# Patient Record
Sex: MALE | Race: WHITE | NOT HISPANIC OR LATINO | Employment: OTHER | URBAN - METROPOLITAN AREA
[De-identification: names, ages, dates, MRNs, and addresses within clinical notes are randomized per-mention and may not be internally consistent; named-entity substitution may affect disease eponyms.]

---

## 2017-04-03 ENCOUNTER — AMBULATORY - HEALTHEAST (OUTPATIENT)
Dept: CARDIOLOGY | Facility: CLINIC | Age: 77
End: 2017-04-03

## 2017-04-04 ENCOUNTER — OFFICE VISIT - HEALTHEAST (OUTPATIENT)
Dept: CARDIOLOGY | Facility: CLINIC | Age: 77
End: 2017-04-04

## 2017-04-04 DIAGNOSIS — G47.33 OBSTRUCTIVE SLEEP APNEA SYNDROME: ICD-10-CM

## 2017-04-04 DIAGNOSIS — I47.20 VT (VENTRICULAR TACHYCARDIA) (H): ICD-10-CM

## 2017-04-04 DIAGNOSIS — J44.89 CHRONIC AIRWAY OBSTRUCTION, NOT ELSEWHERE CLASSIFIED: ICD-10-CM

## 2017-04-04 RX ORDER — SIMVASTATIN 80 MG
80 TABLET ORAL AT BEDTIME
Status: SHIPPED | COMMUNITY
Start: 2017-04-04

## 2017-04-04 RX ORDER — OXYCODONE HYDROCHLORIDE 5 MG/1
5 TABLET ORAL EVERY 4 HOURS PRN
Status: SHIPPED | COMMUNITY
Start: 2017-04-04

## 2017-04-04 ASSESSMENT — MIFFLIN-ST. JEOR: SCORE: 1632.06

## 2017-04-05 ENCOUNTER — COMMUNICATION - HEALTHEAST (OUTPATIENT)
Dept: CARDIOLOGY | Facility: CLINIC | Age: 77
End: 2017-04-05

## 2017-04-07 ENCOUNTER — HOSPITAL ENCOUNTER (OUTPATIENT)
Dept: CARDIOLOGY | Facility: CLINIC | Age: 77
Discharge: HOME | End: 2017-04-07
Attending: INTERNAL MEDICINE

## 2017-04-09 ENCOUNTER — COMMUNICATION - HEALTHEAST (OUTPATIENT)
Dept: CARDIOLOGY | Facility: CLINIC | Age: 77
End: 2017-04-09

## 2018-02-27 ENCOUNTER — COMMUNICATION - HEALTHEAST (OUTPATIENT)
Dept: ADMINISTRATIVE | Facility: CLINIC | Age: 78
End: 2018-02-27

## 2018-05-17 ENCOUNTER — OFFICE VISIT - HEALTHEAST (OUTPATIENT)
Dept: CARDIOLOGY | Facility: CLINIC | Age: 78
End: 2018-05-17

## 2018-05-17 DIAGNOSIS — I31.9 DISEASE OF PERICARDIUM: ICD-10-CM

## 2018-05-17 DIAGNOSIS — I47.20 VT (VENTRICULAR TACHYCARDIA) (H): ICD-10-CM

## 2018-05-17 DIAGNOSIS — G47.33 OBSTRUCTIVE SLEEP APNEA SYNDROME: ICD-10-CM

## 2018-05-17 RX ORDER — MECLIZINE HCL 12.5 MG 12.5 MG/1
1-2 TABLET ORAL 3 TIMES DAILY PRN
Status: SHIPPED | COMMUNITY
Start: 2018-05-04

## 2018-05-17 RX ORDER — ACETAMINOPHEN 325 MG/1
650 TABLET ORAL EVERY 6 HOURS PRN
Status: SHIPPED | COMMUNITY
Start: 2018-05-17

## 2018-05-17 RX ORDER — ASPIRIN 325 MG
325 TABLET, DELAYED RELEASE (ENTERIC COATED) ORAL DAILY
Status: SHIPPED | COMMUNITY
Start: 2018-05-17

## 2018-05-17 ASSESSMENT — MIFFLIN-ST. JEOR: SCORE: 1681.95

## 2018-12-20 ENCOUNTER — RECORDS - HEALTHEAST (OUTPATIENT)
Dept: ADMINISTRATIVE | Facility: OTHER | Age: 78
End: 2018-12-20

## 2019-03-29 ENCOUNTER — RECORDS - HEALTHEAST (OUTPATIENT)
Dept: ADMINISTRATIVE | Facility: OTHER | Age: 79
End: 2019-03-29

## 2019-07-23 ENCOUNTER — RECORDS - HEALTHEAST (OUTPATIENT)
Dept: ADMINISTRATIVE | Facility: OTHER | Age: 79
End: 2019-07-23

## 2019-10-30 ENCOUNTER — OFFICE VISIT - HEALTHEAST (OUTPATIENT)
Dept: CARDIOLOGY | Facility: CLINIC | Age: 79
End: 2019-10-30

## 2019-10-30 DIAGNOSIS — G47.33 OBSTRUCTIVE SLEEP APNEA SYNDROME: ICD-10-CM

## 2019-10-30 DIAGNOSIS — I31.9 DISEASE OF PERICARDIUM: ICD-10-CM

## 2019-10-30 DIAGNOSIS — I47.20 VT (VENTRICULAR TACHYCARDIA) (H): ICD-10-CM

## 2020-11-14 ENCOUNTER — COMMUNICATION - HEALTHEAST (OUTPATIENT)
Dept: CARDIOLOGY | Facility: CLINIC | Age: 80
End: 2020-11-14

## 2020-11-14 DIAGNOSIS — I47.20 VT (VENTRICULAR TACHYCARDIA) (H): ICD-10-CM

## 2020-11-14 DIAGNOSIS — G47.33 OBSTRUCTIVE SLEEP APNEA SYNDROME: ICD-10-CM

## 2020-11-14 DIAGNOSIS — I31.9 DISEASE OF PERICARDIUM: ICD-10-CM

## 2020-12-02 ENCOUNTER — OFFICE VISIT - HEALTHEAST (OUTPATIENT)
Dept: CARDIOLOGY | Facility: CLINIC | Age: 80
End: 2020-12-02

## 2020-12-02 DIAGNOSIS — I47.20 VT (VENTRICULAR TACHYCARDIA) (H): ICD-10-CM

## 2020-12-02 DIAGNOSIS — G47.33 OBSTRUCTIVE SLEEP APNEA SYNDROME: ICD-10-CM

## 2020-12-02 RX ORDER — INSULIN DEGLUDEC 100 U/ML
7 INJECTION, SOLUTION SUBCUTANEOUS
Status: SHIPPED | COMMUNITY
Start: 2020-04-13

## 2021-02-03 ENCOUNTER — COMMUNICATION - HEALTHEAST (OUTPATIENT)
Dept: CARDIOLOGY | Facility: CLINIC | Age: 81
End: 2021-02-03

## 2021-02-03 DIAGNOSIS — I47.20 VT (VENTRICULAR TACHYCARDIA) (H): ICD-10-CM

## 2021-02-03 DIAGNOSIS — I31.9 DISEASE OF PERICARDIUM: ICD-10-CM

## 2021-02-03 DIAGNOSIS — G47.33 OBSTRUCTIVE SLEEP APNEA SYNDROME: ICD-10-CM

## 2021-02-04 RX ORDER — METOPROLOL SUCCINATE 100 MG/1
TABLET, EXTENDED RELEASE ORAL
Qty: 90 TABLET | Refills: 3 | Status: SHIPPED | OUTPATIENT
Start: 2021-02-04 | End: 2021-12-27

## 2021-05-06 ENCOUNTER — COMMUNICATION - HEALTHEAST (OUTPATIENT)
Dept: FAMILY MEDICINE | Facility: CLINIC | Age: 81
End: 2021-05-06

## 2021-05-06 ENCOUNTER — RECORDS - HEALTHEAST (OUTPATIENT)
Dept: TELEHEALTH | Facility: CLINIC | Age: 81
End: 2021-05-06

## 2021-05-30 VITALS — BODY MASS INDEX: 34.87 KG/M2 | WEIGHT: 217 LBS | HEIGHT: 66 IN

## 2021-06-01 VITALS — WEIGHT: 228 LBS | BODY MASS INDEX: 36.64 KG/M2 | HEIGHT: 66 IN

## 2021-06-02 NOTE — PROGRESS NOTES
[facility] Heart Care Note    Assessment:   Chronic obstructive pulmonary disease, remote cigarette use.   Satisfactory cholesterol profile while on treatment.    Prostate cancer with prior radical prostatectomy. No Lupron therapy for the past 2-1/2 years. PSA about 1   Obstructive sleep apnea with utility of CPAP-1999.   Chronic pericardial effusion dating back to 1999 without hemodynamic         compromise or tamponade. Echocardiogram 1 13 2015 unchanged; small posterior pericardial effusion       Echocardiogram April 26, 2018 showed small pericardial effusion with no tamponade  ejection fraction 59%  7. History of repetitive monoformic ventricular tachycardia with successful ablation of PVCs 5/11/2000 without recurrence.  9. 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 26, 2018-no carotid blockage       Plan:    Your heart condition seems quite stable over past year   Blood pressure and heart rate are good  Recent blood work, July 25, 2019 was excellent  Echocardiogram April 27, 2018 showed strong heart function  You are scheduled to have a chest CT scan November 15 to follow-up on lung cancer  Reviewed your medicines shows that you on 2 beta-blockers including atenolol 25 mg twice daily and metoprolol 50 mg daily  I would recommend increase metoprolol to 100 mg daily-prescription called; and stopping atenolol  Continue other medications  Return in 1 year for comprehensive cardiac evaluation-sooner if problems arise        Subjective:    I had the opportunity to see.Osvaldo Perkins , who is a 79 y.o. male with a known history of prostate cancer treated with radical prostatectomy and lung cancer treated with surgical resection of left lung region  Silvestre has had no awareness of palpitations or arrhythmias no  syncopal or near syncopal episodes  Reviewed his medicine shows that he is on atenolol 25 mg twice daily and metoprolol 50 mg daily-not clear why he is on 2 different beta-blockers but has been on those for quite some time we discussed simplifying the medical program I recommend he increase metoprolol to 100 mg daily and stop the atenolol  He has had a number of studies done through the [facility] and I reviewed those through care everywhere.    On July 25, 2019 his laboratory evaluation was normal and creatinine was 0.95 liver panel was normal  There is some concern of left lung mass and is scheduled for a chest CT scan November 15  His PSA remains elevated around 1 even though he has had a radical prostatectomy.  He no longer takes Lupron  He has less dizziness and vertigo.  Symptoms are related to a cerebellar stroke April 26, 2018-at that time there was no carotid blockage     Problem List:  Patient Active Problem List   Diagnosis     Type 2 Diabetes Mellitus     Obesity     Pericardial Effusion     Chronic Obstructive Pulmonary Disease     Sleep Apnea     VT (ventricular tachycardia) (H)     Medical History:  No past medical history on file.  Surgical History:  Past Surgical History:   Procedure Laterality Date     [place] ABLATE DYSRHYTHM FOCUS RX VTACK      Description: Catheter Ablation Of Focus For Ventricular Tachycardia;  Recorded: 12/18/2012;     Social History:  Social History     Socioeconomic History     Marital status:      Spouse name: Not on file     Number of children: Not on file     Years of education: Not on file     Highest education level: Not on file   Occupational History     Not on file   Social Needs     Financial resource strain: Not on file     Food insecurity:     Worry: Not on file     Inability: Not on file     Transportation needs:     Medical: Not on file     Non-medical: Not on file   Tobacco Use     Smoking status: Former Smoker     Types: Cigarettes     Smokeless tobacco:  Never Used   Substance and Sexual Activity     Alcohol use: Not on file     Drug use: No     Sexual activity: Not on file   Lifestyle     Physical activity:     Days per week: Not on file     Minutes per session: Not on file     Stress: Not on file   Relationships     Social connections:     Talks on phone: Not on file     Gets together: Not on file     Attends Confucianist service: Not on file     Active member of club or organization: Not on file     Attends meetings of clubs or organizations: Not on file     Relationship status: Not on file     Intimate partner violence:     Fear of current or ex partner: Not on file     Emotionally abused: Not on file     Physically abused: Not on file     Forced sexual activity: Not on file   Other Topics Concern     Not on file   Social History Narrative     Not on file       Review of Systems:      General: Weight Gain  Eyes: WNL  Ears/Nose/Throat: Hearing Loss  Lungs: Snoring     Stomach: WNL  Bladder: WNL  Muscle/Joints: WNL  Skin: WNL  Nervous System: Daytime Sleepiness  Mental Health: WNL     Blood: WNL        Family History:  No family history on file.      Allergies:  Allergies   Allergen Reactions     Cefuroxime Axetil      Lisinopril        Medications:  Current Outpatient Medications   Medication Sig Dispense Refill     acetaminophen (TYLENOL) 325 MG tablet Take 650 mg by mouth every 6 (six) hours as needed for pain.       aspirin 325 MG EC tablet Take 325 mg by mouth daily.       buPROPion (WELLBUTRIN SR) 150 MG 12 hr tablet Take 150 mg by mouth 2 (two) times a day.       canagliflozin-metformin (INVOKAMET) 150-1,000 mg Tab Take 1 tablet by mouth 2 (two) times a day.       cholecalciferol, vitamin D3, (VITAMIN D3) 2,000 unit cap Take 5,000 Units by mouth daily.        cyanocobalamin (VITAMIN B-12) 1000 MCG tablet Take 1,000 mcg by mouth daily.        furosemide (LASIX) 40 MG tablet Take 40 mg by mouth daily.        glimepiride (AMARYL) 4 MG tablet Take 1 tablet by  mouth daily.       INVOKANA 300 mg Tab 300 mg once.        loratadine (CLARITIN) 10 mg tablet Take 10 mg by mouth daily.       metFORMIN (GLUCOPHAGE) 500 MG tablet Take 1,000 mg by mouth 2 (two) times a day.       potassium chloride SA (K-DUR,KLOR-CON) 20 MEQ tablet Take 20 mEq by mouth daily.       sertraline (ZOLOFT) 50 MG tablet Take 50 mg by mouth daily.       simvastatin (ZOCOR) 80 MG tablet Take 80 mg by mouth at bedtime.       albuterol (PROVENTIL HFA;VENTOLIN HFA) 90 mcg/actuation inhaler Inhale 2 puffs 4 (four) times a day. As directed       ALBUTEROL INHL Inhale 2 puffs 4 (four) times a day as needed.       celecoxib (CELEBREX) 100 MG capsule 100 mg 2 (two) times a day.        fluticasone (FLOVENT HFA) 110 mcg/actuation inhaler Inhale 2 puffs 2 (two) times a day.       glimepiride (AMARYL) 2 MG tablet Take 2 mg by mouth 2 (two) times a day.        ipratropium (ATROVENT HFA) 17 mcg/actuation inhaler Inhale 1 or 2 puffs 4 times daily as needed       meclizine (ANTIVERT) 12.5 mg tablet Take 1-2 tablets by mouth 3 (three) times a day as needed.       metoprolol succinate (TOPROL-XL) 100 MG 24 hr tablet Take 1 tablet (100 mg total) by mouth daily. 90 tablet 5     oxyCODONE (ROXICODONE) 5 MG immediate release tablet Take 5 mg by mouth every 4 (four) hours as needed for pain.       No current facility-administered medications for this visit.        Objective:   Vital signs:  /58 (Patient Site: Left Arm, Patient Position: Sitting, Cuff Size: Adult Regular)   Pulse 63   Resp 18   Wt 216 lb 3 oz (98.1 kg)   SpO2 99%   BMI 34.89 kg/m    Pulses 16 regular with no ectopic beats      Physical Exam:      GENERAL APPEARANCE: Alert, cooperative and in no acute distress.  HEENT: No scleral icterus. No Xanthelasma. Oral mucous membranes pink and moist.  NECK: JVP normal cm. No Hepatojugular reflux. Thyroid not  Palpable  CHEST: clear to auscultation and percussion  CARDIOVASCULAR: S1, S2 without murmur     Brachial, radial  pulses are intact and symmetric.   No carotid bruits noted.  ABDOMEN: Non tender. BS+. No bruits.  EXTREMITIES: No cyanosis, clubbing or edema.    Lab Results:  LIPIDS:  No results found for: CHOL  No results found for: HDL  No results found for: LDLCALC  No results found for: TRIG  No components found for: CHOLHDL    BMP:  No results found for: CREATININE, BUN, NA, K, CL, CO2      This note has been dictated using voice recognition software. Any grammatical or context distortions are unintentional and inherent to the software.  Tonny Miller MD  [facility]  [phone]

## 2021-06-02 NOTE — PATIENT INSTRUCTIONS - HE
Osvaldo Perkins    Thank you for coming to the [facility] today for a cardiac evaluation  It was my pleasure to see you today  A good contact for any questions would be Maritza Mcclure  RN @ [phone]    Your heart condition seems quite stable over past year   Blood pressure and heart rate are good  Recent blood work, July 25, 2019 was excellent  Echocardiogram April 27, 2018 showed strong heart function  You are scheduled to have a chest CT scan November 15 to follow-up on lung cancer  Reviewed your medicines shows that you on 2 beta-blockers including atenolol 25 mg twice daily and metoprolol 50 mg daily  I would recommend increase metoprolol to 100 mg daily-prescription called; and stopping atenolol  Continue other medications  Return in 1 year for comprehensive cardiac evaluation-sooner if problems arise

## 2021-06-03 VITALS
SYSTOLIC BLOOD PRESSURE: 122 MMHG | HEART RATE: 63 BPM | DIASTOLIC BLOOD PRESSURE: 58 MMHG | BODY MASS INDEX: 34.89 KG/M2 | OXYGEN SATURATION: 99 % | WEIGHT: 216.19 LBS | RESPIRATION RATE: 18 BRPM

## 2021-06-05 VITALS
DIASTOLIC BLOOD PRESSURE: 64 MMHG | WEIGHT: 212.5 LBS | OXYGEN SATURATION: 96 % | BODY MASS INDEX: 34.3 KG/M2 | SYSTOLIC BLOOD PRESSURE: 116 MMHG | RESPIRATION RATE: 14 BRPM | HEART RATE: 70 BPM

## 2021-06-09 NOTE — PROGRESS NOTES
[facility] Heart Care Note    Assessment:  1. Obesity, undergoing dietary consultation his weight is dropped from 250-228 pounds  2. Chronic obstructive pulmonary disease, remote cigarette use.   3. Satisfactory cholesterol profile while on treatment.   4. Prostate cancer with prior radical prostatectomy. No Lupron therapy for the past 2-1/2 years. PSA about 1  5. Obstructive sleep apnea with utility of CPAP-1999.   6. Chronic pericardial effusion dating back to 1999 without hemodynamic   compromise or tamponade. Echocardiogram 1 13 2015 unchanged; small posterior pericardial effusion  7. History of repetitive monoformic ventricular tachycardia with successful ablation of PVCs 5/11/2000 without recurrence.   Satisfactory colonoscopy   9. Adult onset diabetes mellitus on metformin with controlled A1c  Left lung cancer treated with surgical resection September 23 2016-noted by incidental chest CT scan  Cholecystectomy August 26 2016    Plan:  Obtain 24-hour Holter monitor to evaluate possible palpitations  Continue current medications  Will discuss results of Holter monitor  Follow-up in 1 year or sooner as needed arises        Subjective:    I had the opportunity to see.Osvaldo Perkins , who is a 76 y.o. male with a known history of ventricular arrhythmias, ventricular tachycardia and previous ventricular tachycardia ablation  He had a cholecystectomy August 26 2016. During that evaluation a chest CT scan showed a left lung cancer  He underwent a left lung operation September 23 2016-partial lung resection he did not require radiation or chemotherapy  His been followed by the oncologist and has been given good reports  Sometimes he feels a knot-like sensation sort of like a palpitation  His breathing seems satisfactory, hasn't changed since his lung operation  He denies angina  His fluid level seemed controlled and he has minimal pedal edema no orthopnea or PND  He has had no syncopal episodes  I reviewed some recent  laboratory studies that showed a normal CBC, BMP      Problem List:  Patient Active Problem List   Diagnosis     Type 2 Diabetes Mellitus     Obesity     Pericardial Effusion     Chronic Obstructive Pulmonary Disease     Sleep Apnea     Medical History:  No past medical history on file.  Surgical History:  Past Surgical History:   Procedure Laterality Date     [place] ABLATE DYSRHYTHM FOCUS RX VTACK      Description: Catheter Ablation Of Focus For Ventricular Tachycardia;  Recorded: 12/18/2012;     Social History:  Social History     Social History     Marital status:      Spouse name: N/A     Number of children: N/A     Years of education: N/A     Occupational History     Not on file.     Social History Main Topics     Smoking status: Former Smoker     Smokeless tobacco: Not on file     Alcohol use Not on file     Drug use: Not on file     Sexual activity: Not on file     Other Topics Concern     Not on file     Social History Narrative       Review of Systems:      General: WNL  Eyes: WNL  Ears/Nose/Throat: WNL  Lungs: WNL  Heart: WNL  Stomach: WNL  Bladder: WNL  Muscle/Joints: WNL  Skin: WNL  Nervous System: WNL  Mental Health: WNL     Blood: WNL        Family History:  No family history on file.      Allergies:  Allergies   Allergen Reactions     Cefuroxime Axetil      Lisinopril        Medications:  Current Outpatient Prescriptions   Medication Sig Dispense Refill     aspirin 81 MG EC tablet Take 81 mg by mouth daily.       atenolol (TENORMIN) 25 MG tablet Take 25 mg by mouth 2 (two) times a day.       buPROPion (WELLBUTRIN SR) 150 MG 12 hr tablet Take 150 mg by mouth 2 (two) times a day.       cholecalciferol, vitamin D3, (VITAMIN D3) 2,000 unit cap Take 1 capsule by mouth daily.       cyanocobalamin (VITAMIN B-12) 1000 MCG tablet Take 5,000 mcg by mouth daily.        furosemide (LASIX) 40 MG tablet Take 60 mg by mouth daily.       glimepiride (AMARYL) 2 MG tablet Take 2 mg by mouth every morning before  "breakfast.       INVOKANA 300 mg Tab 300 mg once.        loratadine (CLARITIN) 10 mg tablet Take 10 mg by mouth daily.       metFORMIN (GLUCOPHAGE) 500 MG tablet Take 1,000 mg by mouth 2 (two) times a day.       oxyCODONE (ROXICODONE) 5 MG immediate release tablet Take 5 mg by mouth every 4 (four) hours as needed for pain.       potassium chloride SA (K-DUR,KLOR-CON) 20 MEQ tablet Take 20 mEq by mouth daily.       sertraline (ZOLOFT) 50 MG tablet Take 50 mg by mouth daily.       simvastatin (ZOCOR) 80 MG tablet Take 80 mg by mouth at bedtime.       albuterol (PROVENTIL HFA;VENTOLIN HFA) 90 mcg/actuation inhaler Inhale 2 puffs 4 (four) times a day. As directed       ALBUTEROL INHL Inhale 2 puffs 4 (four) times a day as needed.       celecoxib (CELEBREX) 100 MG capsule 100 mg 2 (two) times a day.        fluticasone (FLOVENT HFA) 110 mcg/actuation inhaler Inhale 2 puffs 2 (two) times a day.       ipratropium (ATROVENT HFA) 17 mcg/actuation inhaler Inhale 1 or 2 puffs 4 times daily as needed       simvastatin (ZOCOR) 40 MG tablet Take 40 mg by mouth bedtime.       No current facility-administered medications for this visit.        Objective:   Vital signs:  /60 (Patient Site: Left Arm, Patient Position: Sitting, Cuff Size: Adult Large)  Pulse 72  Resp 16  Ht 5' 6\" (1.676 m)  Wt 217 lb (98.4 kg)  BMI 35.02 kg/m2      Physical Exam:  120/70 sitting, 128 standing heart rate is 68 regular without ectopics      GENERAL APPEARANCE: Alert, cooperative and in no acute distress.  HEENT: No scleral icterus. No Xanthelasma. Oral mucous membranes pink and moist.  NECK: JVP  Normal cm. No Hepatojugular reflux. Thyroid not  Palpable  CHEST: clear to auscultation and percussion left posterior thoracotomy scar  CARDIOVASCULAR: S1, S2 without murmur    Brachial, radial  pulses are intact and symmetric.   No carotid bruits noted.  ABDOMEN: Non tender. BS+. No bruits.  Midline laparoscopic scars from previous " cholecystectomy/laparoscopic  EXTREMITIES: No cyanosis, clubbing or edema.    Lab Results:  LIPIDS:  No results found for: CHOL  No results found for: HDL  No results found for: LDLCALC  No results found for: TRIG  No components found for: CHOLHDL    BMP:  No results found for: CREATININE, BUN, NA, K, CL, CO2      This note has been dictated using voice recognition software. Any grammatical or context distortions are unintentional and inherent to the software.  Tonny Miller MD  [facility]  [phone]

## 2021-06-13 NOTE — PATIENT INSTRUCTIONS - HE
Osvaldo Perkins    Thank you for coming to the [facility] today for a cardiac evaluation  It was my pleasure to see you today  A good contact for any questions would be Maritza Mcclure  RN @ [phone]    Your heart condition seems quite stable  No evidence for significant arrhythmias  No fluid buildup or signs of congestion  You did have a lung biopsy the other day and this needs to be further evaluated and treated.  Apparently the pathology showed that it was different from your left upper lobe cancer that was treated with lobectomy September 23, 2016  Continue your  current medications no adjustments  Return in 1 year for comprehensive cardiac assessment

## 2021-06-13 NOTE — PROGRESS NOTES
[facility] Heart Care Note    Assessment:   Chronic obstructive pulmonary disease, remote cigarette use.   CPAP; MARY BETH  Satisfactory cholesterol profile while on treatment.    Prostate cancer with prior radical prostatectomy. No Lupron therapy for the past 2-1/2 years. PSA about 1  Chronic pericardial effusion dating back to 1999 without hemodynamic         compromise or tamponade. Echocardiogram 1 13 2015 unchanged; small posterior pericardial effusion       Echocardiogram April 26, 2018 showed small pericardial effusion with no tamponade  ejection fraction 59%  7. History of repetitive monoformic ventricular tachycardia with successful ablation of PVCs 5/11/2000 without recurrence.  9. 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  Apparently the pathology showed that it was different from your left upper lobe cancer that was treated with lobectomy September 23, 2016  Continue your  current medications no adjustments  Return in 1 year for comprehensive cardiac assessment      Subjective:    I had the opportunity to see.Osvaldo Gregorio , who is a 80 y.o. male with a known history of tracheal or arrhythmias  Because of previous lung cancer he does have periodic chest CT scans apparently this 1 was abnormal this led to a cutaneous biopsy the right mid lung and the report showed a no carcinoma,  non-small cell lung cancer-metastatic.  The pathology was different from his left upper lobe cancer  Is not seen neurologist has not had a treatment plan outlined yet  Heart wise he is doing well  No chest pains  No awareness of palpitations arrhythmias or episodes of ventricular tachycardia  Denies orthopnea PND or pedal edema  Reviewed his medications he is have not changed  Continues furosemide 40 mg daily, metoprolol 100 mg daily potassium 20 mEq/day  Also simvastatin 80 mg daily      Problem List:  Patient Active Problem List   Diagnosis     Type 2 Diabetes Mellitus     Obesity     Pericardial Effusion     Chronic Obstructive Pulmonary Disease     Sleep Apnea     VT (ventricular tachycardia) (H)     Medical History:  No past medical history on file.  Surgical History:  Past Surgical History:   Procedure Laterality Date     [place] ABLATE DYSRHYTHM FOCUS RX VTACK      Description: Catheter Ablation Of Focus For Ventricular Tachycardia;  Recorded: 12/18/2012;     Social History:  Social History     Socioeconomic History     Marital status:      Spouse name: Not on file     Number of children: Not on file     Years of education: Not on file     Highest education level: Not on file   Occupational History     Not on file   Social Needs     Financial resource strain: Not on file     Food insecurity     Worry: Not on file     Inability: Not on file     Transportation needs     Medical: Not on file     Non-medical: Not on file   Tobacco Use     Smoking status: Former Smoker     Types: Cigarettes     Smokeless tobacco: Never Used   Substance and Sexual Activity     Alcohol use: Not on file     Drug use: No     Sexual activity: Not on file   Lifestyle     Physical activity     Days per week: Not on file     Minutes per session: Not on file     Stress: Not on file   Relationships     Social connections     Talks on phone: Not on file     Gets together: Not on file     Attends Judaism service: Not on file     Active  member of club or organization: Not on file     Attends meetings of clubs or organizations: Not on file     Relationship status: Not on file     Intimate partner violence     Fear of current or ex partner: Not on file     Emotionally abused: Not on file     Physically abused: Not on file     Forced sexual activity: Not on file   Other Topics Concern     Not on file   Social History Narrative     Not on file       Review of Systems:      General: WNL  Eyes: WNL  Ears/Nose/Throat: WNL  Lungs: WNL  Heart: WNL  Stomach: WNL  Bladder: WNL  Muscle/Joints: WNL  Skin: WNL  Nervous System: WNL  Mental Health: WNL     Blood: WNL        Family History:  No family history on file.      Allergies:  Allergies   Allergen Reactions     Cefuroxime Axetil      Lisinopril        Medications:  Current Outpatient Medications   Medication Sig Dispense Refill     acetaminophen (TYLENOL) 325 MG tablet Take 650 mg by mouth every 6 (six) hours as needed for pain.       aspirin 325 MG EC tablet Take 325 mg by mouth daily.       buPROPion (WELLBUTRIN SR) 150 MG 12 hr tablet Take 150 mg by mouth 2 (two) times a day.       canagliflozin-metformin (INVOKAMET) 150-1,000 mg Tab Take 1 tablet by mouth 2 (two) times a day.       cholecalciferol, vitamin D3, (VITAMIN D3) 2,000 unit cap Take 5,000 Units by mouth daily.        cyanocobalamin (VITAMIN B-12) 1000 MCG tablet Take 1,000 mcg by mouth daily.        furosemide (LASIX) 40 MG tablet Take 40 mg by mouth daily.        glimepiride (AMARYL) 2 MG tablet Take 1 mg by mouth 2 (two) times a day.        insulin degludec (TRESIBA FLEXTOUCH U-100) 100 unit/mL injection pen Inject 7 Units under the skin.       ipratropium (ATROVENT HFA) 17 mcg/actuation inhaler Inhale 1 or 2 puffs 4 times daily as needed       loratadine (CLARITIN) 10 mg tablet Take 10 mg by mouth daily.       metFORMIN (GLUCOPHAGE) 500 MG tablet Take 1,000 mg by mouth 2 (two) times a day.       metoprolol succinate (TOPROL-XL) 100 MG 24 hr  tablet TAKE 1 TABLET BY MOUTH  DAILY 90 tablet 0     potassium chloride SA (K-DUR,KLOR-CON) 20 MEQ tablet Take 20 mEq by mouth daily.       sertraline (ZOLOFT) 50 MG tablet Take 50 mg by mouth daily.       simvastatin (ZOCOR) 80 MG tablet Take 80 mg by mouth at bedtime.       albuterol (PROVENTIL HFA;VENTOLIN HFA) 90 mcg/actuation inhaler Inhale 2 puffs 4 (four) times a day. As directed       celecoxib (CELEBREX) 100 MG capsule 100 mg 2 (two) times a day.        fluticasone (FLOVENT HFA) 110 mcg/actuation inhaler Inhale 2 puffs 2 (two) times a day.       INVOKANA 300 mg Tab 300 mg once.        meclizine (ANTIVERT) 12.5 mg tablet Take 1-2 tablets by mouth 3 (three) times a day as needed.       oxyCODONE (ROXICODONE) 5 MG immediate release tablet Take 5 mg by mouth every 4 (four) hours as needed for pain.       No current facility-administered medications for this visit.      Objective:   Vital signs:  /64 (Patient Site: Left Arm, Patient Position: Sitting, Cuff Size: Adult Regular)   Pulse 70   Resp 14   Wt 212 lb 8 oz (96.4 kg)   SpO2 96%   BMI 34.30 kg/m        Physical Exam:        GENERAL APPEARANCE: Alert, cooperative and in no acute distress.  HEENT: No scleral icterus. No Xanthelasma. Oral mucous membranes pink and moist.  NECK: JVP normal cm. No Hepatojugular reflux. Thyroid not  Palpable  CHEST: clear to auscultation and percussion  CARDIOVASCULAR: S1, S2 without murmur    Brachial, radial  pulses are intact and symmetric.   No carotid bruits noted.  ABDOMEN: Non tender. BS+. No bruits.  EXTREMITIES: No cyanosis, clubbing or edema.    Lab Results:  LIPIDS:  No results found for: CHOL  No results found for: HDL  No results found for: LDLCALC  No results found for: TRIG  No components found for: CHOLHDL    BMP:  No results found for: CREATININE, BUN, NA, K, CL, CO2      This note has been dictated using voice recognition software. Any grammatical or context distortions are unintentional and inherent  to the software.  Tonny Miller MD  [facility]  [phone]

## 2021-06-15 PROBLEM — I47.20 VT (VENTRICULAR TACHYCARDIA) (H): Status: ACTIVE | Noted: 2017-04-04

## 2021-06-17 NOTE — TELEPHONE ENCOUNTER
Telephone Encounter by Shannen Bentley MD at 5/6/2021  5:37 PM     Author: Shannen Bentley MD Service: -- Author Type: Physician    Filed: 5/6/2021  5:37 PM Encounter Date: 5/6/2021 Status: Signed    : Shannen Bentley MD (Physician)    From: Osvaldo Perkins  Sent: 4/23/2021  6:31 PM CDT  To: Shannen Bentley MD  Subject: RE:schedule your covid vaccine appointment    I've already had my Covid Vaccine 02/04/21 and 03/04/21 Moderna

## 2021-06-18 NOTE — PROGRESS NOTES
[facility] Heart Care Note  :  Assessment:  1. Obesity  2. Chronic obstructive pulmonary disease, remote cigarette use.   3. Satisfactory cholesterol profile while on treatment.   4. Prostate cancer with prior radical prostatectomy. No Lupron therapy for the past 2-1/2 years. PSA about 1  5. Obstructive sleep apnea with utility of CPAP-1999.   6. Chronic pericardial effusion dating back to 1999 without hemodynamic         compromise or tamponade. Echocardiogram 1 13 2015 unchanged; small posterior pericardial effusion       Echocardiogram April 26, 2018 showed small pericardial effusion with no tamponade Alhaji ejection fraction 59%  7. History of repetitive monoformic ventricular tachycardia with successful ablation of PVCs 5/11/2000 without recurrence.  9. 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  There was some changes near the cerebellum which is a part of the brain that deals with balance  Dizziness would be a likely symptom from that event  You have noticed  benefit with meclizine/Antivert  The carotid artery and cerebral circulation seems satisfactory as shown by the MR-angiogram study  Medication adjustments were increasing the aspirin from 81 mg daily to 325 mg daily  Blood pressure is satisfactory  Echocardiogram was good  Continue current medications  Return in 1 year for comprehensive cardiac evaluation        Subjective:    I had the opportunity to see.Osvaldo Gregorio , who is a 77 y.o. male with a known history of ventricular tachycardia  He was very dizzy and was evaluated at [facility] on April 26, 2018.    He has had CT scan was normal but the MRI scan did show a localized area near the cerebellum that look  like a stroke.  His MR angiogram did not show any vascular disease  Echocardiogram was unremarkable, small pericardial effusion was present  Laboratory evaluation was normal except for some elevated blood sugar  He apparently had a stroke, and had dizziness.  He was started on Antivert and seems improved  No paralysis no numbness no diplopia no dysphagia  No cardiac symptoms.  He has no chest pain no awareness of palpitations no syncope  He does have shortness of breath this is combination of lung surgery, COPD this seems stable  Minimal pedal edema  Recent medication changes were to increase his aspirin from 81 mg daily to 325 mg daily    Uses CPAP on a regular basis finds it helpful      Problem List:  Patient Active Problem List   Diagnosis     Type 2 Diabetes Mellitus     Obesity     Pericardial Effusion     Chronic Obstructive Pulmonary Disease     Sleep Apnea     VT (ventricular tachycardia)     Medical History:  No past medical history on file.  Surgical History:  Past Surgical History:   Procedure Laterality Date     [place] ABLATE DYSRHYTHM FOCUS RX VTACK      Description: Catheter Ablation Of Focus For Ventricular Tachycardia;  Recorded: 12/18/2012;     Social History:  Social History     Social History     Marital status:      Spouse name: N/A     Number of children: N/A     Years of education: N/A     Occupational History     Not on file.     Social History Main Topics     Smoking status: Former Smoker     Types: Cigarettes     Smokeless tobacco: Never Used     Alcohol use Not on file     Drug use: No     Sexual activity: Not on file     Other Topics Concern     Not on file     Social History Narrative       Review of Systems:      General: Weight Gain  Eyes: WNL  Ears/Nose/Throat: WNL  Lungs: WNL  Heart: WNL  Stomach: WNL  Bladder: WNL  Muscle/Joints: WNL  Skin: WNL  Nervous System: Dizziness  Mental Health: WNL     Blood: WNL        Family History:  No family history on  file.      Allergies:  Allergies   Allergen Reactions     Cefuroxime Axetil      Lisinopril        Medications:  Current Outpatient Prescriptions   Medication Sig Dispense Refill     acetaminophen (TYLENOL) 325 MG tablet Take 650 mg by mouth every 6 (six) hours as needed for pain.       aspirin 325 MG EC tablet Take 325 mg by mouth daily.       buPROPion (WELLBUTRIN SR) 150 MG 12 hr tablet Take 150 mg by mouth 2 (two) times a day.       canagliflozin-metformin (INVOKAMET) 150-1,000 mg Tab Take 1 tablet by mouth 2 (two) times a day.       cholecalciferol, vitamin D3, (VITAMIN D3) 2,000 unit cap Take 5,000 Units by mouth daily.        cyanocobalamin (VITAMIN B-12) 1000 MCG tablet Take 1,000 mcg by mouth daily.        furosemide (LASIX) 40 MG tablet Take 40 mg by mouth daily.        glimepiride (AMARYL) 2 MG tablet Take 2 mg by mouth 2 (two) times a day.        loratadine (CLARITIN) 10 mg tablet Take 10 mg by mouth daily.       meclizine (ANTIVERT) 12.5 mg tablet Take 1-2 tablets by mouth 3 (three) times a day as needed.       metoprolol succinate (TOPROL-XL) 50 MG 24 hr tablet Take 1 tablet by mouth daily.       potassium chloride SA (K-DUR,KLOR-CON) 20 MEQ tablet Take 20 mEq by mouth daily.       sertraline (ZOLOFT) 50 MG tablet Take 50 mg by mouth daily.       simvastatin (ZOCOR) 40 MG tablet Take 40 mg by mouth bedtime.       albuterol (PROVENTIL HFA;VENTOLIN HFA) 90 mcg/actuation inhaler Inhale 2 puffs 4 (four) times a day. As directed       ALBUTEROL INHL Inhale 2 puffs 4 (four) times a day as needed.       aspirin 81 MG EC tablet Take 81 mg by mouth daily.       atenolol (TENORMIN) 25 MG tablet Take 25 mg by mouth 2 (two) times a day.       celecoxib (CELEBREX) 100 MG capsule 100 mg 2 (two) times a day.        fluticasone (FLOVENT HFA) 110 mcg/actuation inhaler Inhale 2 puffs 2 (two) times a day.       glimepiride (AMARYL) 4 MG tablet Take 1 tablet by mouth daily.       INVOKANA 300 mg Tab 300 mg once.         "ipratropium (ATROVENT HFA) 17 mcg/actuation inhaler Inhale 1 or 2 puffs 4 times daily as needed       metFORMIN (GLUCOPHAGE) 500 MG tablet Take 1,000 mg by mouth 2 (two) times a day.       oxyCODONE (ROXICODONE) 5 MG immediate release tablet Take 5 mg by mouth every 4 (four) hours as needed for pain.       simvastatin (ZOCOR) 80 MG tablet Take 80 mg by mouth at bedtime.       No current facility-administered medications for this visit.        Objective:   Vital signs:  /66 (Patient Site: Left Arm, Patient Position: Sitting, Cuff Size: Adult Large)  Pulse 80  Resp 16  Ht 5' 6\" (1.676 m)  Wt (!) 228 lb (103.4 kg)  BMI 36.8 kg/m2      Physical Exam:      GENERAL APPEARANCE: Alert, cooperative and in no acute distress.  HEENT: No scleral icterus. No Xanthelasma. Oral mucous membranes pink and moist.  NECK: JVP normal cm. No Hepatojugular reflux. Thyroid not  Palpable  CHEST: clear to auscultation and percussion  CARDIOVASCULAR: S1, S2 without murmur    Brachial, radial  pulses are intact and symmetric.   No carotid bruits noted.  ABDOMEN: Non tender. BS+. No bruits.  EXTREMITIES: No cyanosis, clubbing or edema.    Lab Results:  LIPIDS:  No results found for: CHOL  No results found for: HDL  No results found for: LDLCALC  No results found for: TRIG  No components found for: CHOLHDL    BMP:  No results found for: CREATININE, BUN, NA, K, CL, CO2      This note has been dictated using voice recognition software. Any grammatical or context distortions are unintentional and inherent to the software.  Tonny Miller MD  [facility]  [phone]            "

## 2021-08-15 ENCOUNTER — HEALTH MAINTENANCE LETTER (OUTPATIENT)
Age: 81
End: 2021-08-15

## 2021-10-11 ENCOUNTER — HEALTH MAINTENANCE LETTER (OUTPATIENT)
Age: 81
End: 2021-10-11

## 2021-12-05 ENCOUNTER — HEALTH MAINTENANCE LETTER (OUTPATIENT)
Age: 81
End: 2021-12-05

## 2021-12-25 DIAGNOSIS — I31.9 DISEASE OF PERICARDIUM: ICD-10-CM

## 2021-12-25 DIAGNOSIS — I47.20 VT (VENTRICULAR TACHYCARDIA) (H): ICD-10-CM

## 2021-12-25 DIAGNOSIS — G47.33 OBSTRUCTIVE SLEEP APNEA SYNDROME: ICD-10-CM

## 2021-12-27 RX ORDER — METOPROLOL SUCCINATE 100 MG/1
TABLET, EXTENDED RELEASE ORAL
Qty: 90 TABLET | Refills: 0 | Status: SHIPPED | OUTPATIENT
Start: 2021-12-27 | End: 2022-03-15

## 2022-03-21 ENCOUNTER — OFFICE VISIT (OUTPATIENT)
Dept: CARDIOLOGY | Facility: CLINIC | Age: 82
End: 2022-03-21
Payer: MEDICARE

## 2022-03-21 VITALS
WEIGHT: 191.9 LBS | RESPIRATION RATE: 12 BRPM | HEART RATE: 56 BPM | SYSTOLIC BLOOD PRESSURE: 140 MMHG | BODY MASS INDEX: 30.97 KG/M2 | DIASTOLIC BLOOD PRESSURE: 52 MMHG

## 2022-03-21 DIAGNOSIS — I31.9 DISEASE OF PERICARDIUM: ICD-10-CM

## 2022-03-21 DIAGNOSIS — I47.20 VT (VENTRICULAR TACHYCARDIA) (H): Primary | ICD-10-CM

## 2022-03-21 DIAGNOSIS — G47.39 OTHER SLEEP APNEA: ICD-10-CM

## 2022-03-21 PROCEDURE — 93000 ELECTROCARDIOGRAM COMPLETE: CPT | Performed by: INTERNAL MEDICINE

## 2022-03-21 PROCEDURE — 99214 OFFICE O/P EST MOD 30 MIN: CPT | Performed by: INTERNAL MEDICINE

## 2022-03-21 NOTE — PROGRESS NOTES
[facility] Heart Care Note   Chronic obstructive pulmonary disease, remote cigarette use.   CPAP; MARY BETH  Satisfactory cholesterol profile while on treatment.    Prostate cancer with prior radical prostatectomy. No Lupron therapy for the past 2-1/2 years. PSA about 1  Chronic pericardial effusion dating back to 1999 without hemodynamic         compromise or tamponade. Echocardiogram 1 13 2015 unchanged; small posterior pericardial effusion       Echocardiogram April 26, 2018 showed small pericardial effusion with no tamponade  ejection fraction 59%  7. History of repetitive monoformic ventricular tachycardia with successful ablation of PVCs 5/11/2000 without recurrence.  9. 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 April 26, 2018-no carotid blockage  ECG shows sinus rhythm with right bundle branch block with blocked PACs resulting in some pauses no high-grade AV block    Plan:    Cardiac status is quite good today  You did have some irregular pulse but this is a representation of premature atrial beats nothing to be concerned  To new current medications  Return in 1 year for general cardiac care  I was sorry to  hear about your wife having progressive dementia          Subjective:    I had the opportunity to see.Osvaldo Ugartenicole , who is a 81 year old male with a known history of ventricular arrhythmias and ventricular tachycardia with previous VT ablation  His wife has progressive dementia now in a nursing home-pretty advanced losing lots of weight does not recognize him  Tries to stay active-walks up to a mile  No  angina  No awareness of palpitations or arrhythmias no syncopal or near syncopal episodes  Does have some shortness of breath-does have COPD  Sees his pulmonary doctor who has been following him for COPD and obstructive sleep apnea  Does follow with his  Oncologist for lung cancer t and is on chemotherapy Invokamet.   reports have been coming up pretty good  No awareness of palpitations or arrhythmias  No edema  His wife has progressive and advanced dementia now living in a nursing home-she is pretty good a year ago  Does have a home alert button that he carries  Also he carries his cell phone  Advised to use a walking stick when he is out walking to help with his balance    His son helps with his meals often bringing him prepared meals    Problem List:  Patient Active Problem List   Diagnosis     Type 2 Diabetes Mellitus     Obesity     Pericardial Effusion     Chronic Obstructive Pulmonary Disease     Sleep Apnea     VT (ventricular tachycardia) (H)     Medical History:  No past medical history on file.  Surgical History:  Past Surgical History:   Procedure Laterality Date     MI ABLATE DYSRHYTHM FOCUS RX VTACK      Description: Catheter Ablation Of Focus For Ventricular Tachycardia;  Recorded: 12/18/2012;     Social History:  Social History     Socioeconomic History     Marital status:      Spouse name: Not on file     Number of children: Not on file     Years of education: Not on file     Highest education level: Not on file   Occupational History     Not on file   Tobacco Use     Smoking status: Former Smoker     Types: Cigarettes, Cigarettes     Smokeless tobacco: Never Used   Substance and Sexual Activity     Alcohol use: Not on file     Drug use: No     Sexual activity: Not on file   Other Topics Concern     Not on file   Social History Narrative     Not on file     Social Determinants of Health     Financial Resource Strain: Not on file   Food Insecurity: Not on file   Transportation Needs: Not on  file   Physical Activity: Not on file   Stress: Not on file   Social Connections: Not on file   Intimate Partner Violence: Not on file   Housing Stability: Not on file       Review of Systems   ,         Family History:  No family history on file.      Allergies:  Allergies   Allergen Reactions     Cefuroxime Axetil [Cefuroxime] Unknown     Lisinopril Unknown       Medications:  Current Outpatient Medications   Medication Sig Dispense Refill     acetaminophen (TYLENOL) 325 MG tablet [ACETAMINOPHEN (TYLENOL) 325 MG TABLET] Take 650 mg by mouth every 6 (six) hours as needed for pain.       albuterol (PROVENTIL HFA;VENTOLIN HFA) 90 mcg/actuation inhaler [ALBUTEROL (PROVENTIL HFA;VENTOLIN HFA) 90 MCG/ACTUATION INHALER] Inhale 2 puffs 4 (four) times a day. As directed       aspirin 325 MG EC tablet [ASPIRIN 325 MG EC TABLET] Take 325 mg by mouth daily.       buPROPion (WELLBUTRIN SR) 150 MG 12 hr tablet [BUPROPION (WELLBUTRIN SR) 150 MG 12 HR TABLET] Take 150 mg by mouth 2 (two) times a day.       canagliflozin-metformin (INVOKAMET) 150-1,000 mg Tab [CANAGLIFLOZIN-METFORMIN (INVOKAMET) 150-1,000 MG TAB] Take 1 tablet by mouth 2 (two) times a day.       cholecalciferol, vitamin D3, (VITAMIN D3) 2,000 unit cap [CHOLECALCIFEROL, VITAMIN D3, (VITAMIN D3) 2,000 UNIT CAP] Take 5,000 Units by mouth daily.        cyanocobalamin (VITAMIN B-12) 1000 MCG tablet [CYANOCOBALAMIN (VITAMIN B-12) 1000 MCG TABLET] Take 1,000 mcg by mouth daily.        furosemide (LASIX) 40 MG tablet [FUROSEMIDE (LASIX) 40 MG TABLET] Take 40 mg by mouth daily.        insulin degludec (TRESIBA FLEXTOUCH U-100) 100 unit/mL injection pen [INSULIN DEGLUDEC (TRESIBA FLEXTOUCH U-100) 100 UNIT/ML INJECTION PEN] Inject 7 Units under the skin.       loratadine (CLARITIN) 10 mg tablet [LORATADINE (CLARITIN) 10 MG TABLET] Take 10 mg by mouth daily.       metFORMIN (GLUCOPHAGE) 500 MG tablet [METFORMIN (GLUCOPHAGE) 500 MG TABLET] Take 1,000 mg by mouth 2 (two) times  a day.       metoprolol succinate ER (TOPROL-XL) 100 MG 24 hr tablet TAKE 1 TABLET BY MOUTH  DAILY 90 tablet 0     oxyCODONE (ROXICODONE) 5 MG immediate release tablet [OXYCODONE (ROXICODONE) 5 MG IMMEDIATE RELEASE TABLET] Take 5 mg by mouth every 4 (four) hours as needed for pain.       potassium chloride SA (K-DUR,KLOR-CON) 20 MEQ tablet [POTASSIUM CHLORIDE SA (K-DUR,KLOR-CON) 20 MEQ TABLET] Take 20 mEq by mouth daily.       sertraline (ZOLOFT) 50 MG tablet [SERTRALINE (ZOLOFT) 50 MG TABLET] Take 50 mg by mouth daily.       simvastatin (ZOCOR) 80 MG tablet [SIMVASTATIN (ZOCOR) 80 MG TABLET] Take 80 mg by mouth at bedtime.       celecoxib (CELEBREX) 100 MG capsule [CELECOXIB (CELEBREX) 100 MG CAPSULE] 100 mg 2 (two) times a day.  (Patient not taking: Reported on 3/21/2022)       fluticasone (FLOVENT HFA) 110 mcg/actuation inhaler [FLUTICASONE (FLOVENT HFA) 110 MCG/ACTUATION INHALER] Inhale 2 puffs 2 (two) times a day. (Patient not taking: Reported on 3/21/2022)       glimepiride (AMARYL) 2 MG tablet [GLIMEPIRIDE (AMARYL) 2 MG TABLET] Take 1 mg by mouth 2 (two) times a day.  (Patient not taking: Reported on 3/21/2022)       INVOKANA 300 mg Tab [INVOKANA 300 MG TAB] 300 mg once.  (Patient not taking: Reported on 3/21/2022)       ipratropium (ATROVENT HFA) 17 mcg/actuation inhaler [IPRATROPIUM (ATROVENT HFA) 17 MCG/ACTUATION INHALER] Inhale 1 or 2 puffs 4 times daily as needed (Patient not taking: Reported on 3/21/2022)       meclizine (ANTIVERT) 12.5 mg tablet [MECLIZINE (ANTIVERT) 12.5 MG TABLET] Take 1-2 tablets by mouth 3 (three) times a day as needed. (Patient not taking: Reported on 3/21/2022)       Objective:   Vital signs:  BP (!) 140/52 (BP Location: Right arm, Patient Position: Sitting, Cuff Size: Adult Regular)   Pulse 56   Resp 12   Wt 87 kg (191 lb 14.4 oz)   BMI 30.97 kg/m    Heart rate about 60 and irregular with ectopic or missed beats about every third    Physical Exam:          GENERAL  APPEARANCE: Alert, cooperative and in no acute distress.  HEENT: No scleral icterus. No Xanthelasma. Oral mucous membranes pink and moist.  NECK: JVP normal cm. No Hepatojugular reflux. Thyroid not  Palpable  CHEST: clear to auscultation and percussion  CARDIOVASCULAR: S1, S2 without murmur    Brachial, radial  pulses are intact and symmetric.   No carotid bruits noted.  ABDOMEN: Non tender. BS+. No bruits.  EXTREMITIES: No cyanosis, clubbing or edema.    Lab Results:  LIPIDS:  No results found for: CHOL  No results found for: HDL  No components found for: LDLCALC  No results found for: TRIG  No results found for: CHOLHDL    BMP:  No results found for: CREATININE, BUN, NA, CO2      This note has been dictated using voice recognition software. Any grammatical or context distortions are unintentional and inherent to the software.  Tonny Miller MD  [facility], [facility]  [phone]

## 2022-03-21 NOTE — PATIENT INSTRUCTIONS
Osvaldo Perkins    Thank you for coming to the [facility] today for a cardiac evaluation  It was my pleasure to see you today  A good contact for any questions would be Maritza Mcclure  RN @ [phone]    Cardiac status is quite good today  You did have some irregular pulse but this is a representation of premature atrial beats nothing to be concerned  To new current medications  Return in 1 year for general cardiac care  I was sorry to  hear about your wife having progressive dementia

## 2022-03-21 NOTE — LETTER
3/21/2022    OLU MOLINA  [facility] [address]  [place] [facility] [address]    RE: Osvaldo Perkins       Dear Colleague,     I had the pleasure of seeing Osvaldo Perkins in the [facility].  [facility] Heart Care Note   Chronic obstructive pulmonary disease, remote cigarette use.   CPAP; MARY BETH  Satisfactory cholesterol profile while on treatment.    Prostate cancer with prior radical prostatectomy. No Lupron therapy for the past 2-1/2 years. PSA about 1  Chronic pericardial effusion dating back to 1999 without hemodynamic         compromise or tamponade. Echocardiogram 1 13 2015 unchanged; small posterior pericardial effusion       Echocardiogram April 26, 2018 showed small pericardial effusion with no tamponade  ejection fraction 59%  7. History of repetitive monoformic ventricular tachycardia with successful ablation of PVCs 5/11/2000 without recurrence.  9. 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 April 26, 2018-no carotid blockage  ECG shows sinus rhythm with right bundle branch block with blocked PACs resulting in some pauses no high-grade AV block    Plan:    Cardiac status is quite good today  You did have some irregular pulse but this is a representation of premature atrial beats nothing to be concerned  To new current medications  Return in 1 year for general cardiac care  I was sorry to  hear about your wife having progressive dementia          Subjective:    I had the opportunity to see.Osvaldo Ugartenicole , who is a 81 year old male with a known history of ventricular arrhythmias  and ventricular tachycardia with previous VT ablation  His wife has progressive dementia now in a nursing home-pretty advanced losing lots of weight does not recognize him  Tries to stay active-walks up to a mile  No angina  No awareness of palpitations or arrhythmias no syncopal or near syncopal episodes  Does have some shortness of breath-does have COPD  Sees his pulmonary doctor who has been following him for COPD and obstructive sleep apnea  Does follow with his  Oncologist for lung cancer t and is on chemotherapy Invokamet.   reports have been coming up pretty good  No awareness of palpitations or arrhythmias  No edema  His wife has progressive and advanced dementia now living in a nursing home-she is pretty good a year ago  Does have a home alert button that he carries  Also he carries his cell phone  Advised to use a walking stick when he is out walking to help with his balance    His son helps with his meals often bringing him prepared meals    Problem List:  Patient Active Problem List   Diagnosis     Type 2 Diabetes Mellitus     Obesity     Pericardial Effusion     Chronic Obstructive Pulmonary Disease     Sleep Apnea     VT (ventricular tachycardia) (H)     Medical History:  No past medical history on file.  Surgical History:  Past Surgical History:   Procedure Laterality Date     CT ABLATE DYSRHYTHM FOCUS RX VTACK      Description: Catheter Ablation Of Focus For Ventricular Tachycardia;  Recorded: 12/18/2012;     Social History:  Social History     Socioeconomic History     Marital status:      Spouse name: Not on file     Number of children: Not on file     Years of education: Not on file     Highest education level: Not on file   Occupational History     Not on file   Tobacco Use     Smoking status: Former Smoker     Types: Cigarettes, Cigarettes     Smokeless tobacco: Never Used   Substance and Sexual Activity     Alcohol use: Not on file     Drug use: No     Sexual activity: Not on file    Other Topics Concern     Not on file   Social History Narrative     Not on file     Social Determinants of Health     Financial Resource Strain: Not on file   Food Insecurity: Not on file   Transportation Needs: Not on file   Physical Activity: Not on file   Stress: Not on file   Social Connections: Not on file   Intimate Partner Violence: Not on file   Housing Stability: Not on file       Review of Systems   ,         Family History:  No family history on file.      Allergies:  Allergies   Allergen Reactions     Cefuroxime Axetil [Cefuroxime] Unknown     Lisinopril Unknown       Medications:  Current Outpatient Medications   Medication Sig Dispense Refill     acetaminophen (TYLENOL) 325 MG tablet [ACETAMINOPHEN (TYLENOL) 325 MG TABLET] Take 650 mg by mouth every 6 (six) hours as needed for pain.       albuterol (PROVENTIL HFA;VENTOLIN HFA) 90 mcg/actuation inhaler [ALBUTEROL (PROVENTIL HFA;VENTOLIN HFA) 90 MCG/ACTUATION INHALER] Inhale 2 puffs 4 (four) times a day. As directed       aspirin 325 MG EC tablet [ASPIRIN 325 MG EC TABLET] Take 325 mg by mouth daily.       buPROPion (WELLBUTRIN SR) 150 MG 12 hr tablet [BUPROPION (WELLBUTRIN SR) 150 MG 12 HR TABLET] Take 150 mg by mouth 2 (two) times a day.       canagliflozin-metformin (INVOKAMET) 150-1,000 mg Tab [CANAGLIFLOZIN-METFORMIN (INVOKAMET) 150-1,000 MG TAB] Take 1 tablet by mouth 2 (two) times a day.       cholecalciferol, vitamin D3, (VITAMIN D3) 2,000 unit cap [CHOLECALCIFEROL, VITAMIN D3, (VITAMIN D3) 2,000 UNIT CAP] Take 5,000 Units by mouth daily.        cyanocobalamin (VITAMIN B-12) 1000 MCG tablet [CYANOCOBALAMIN (VITAMIN B-12) 1000 MCG TABLET] Take 1,000 mcg by mouth daily.        furosemide (LASIX) 40 MG tablet [FUROSEMIDE (LASIX) 40 MG TABLET] Take 40 mg by mouth daily.        insulin degludec (TRESIBA FLEXTOUCH U-100) 100 unit/mL injection pen [INSULIN DEGLUDEC (TRESIBA FLEXTOUCH U-100) 100 UNIT/ML INJECTION PEN] Inject 7 Units under the skin.        loratadine (CLARITIN) 10 mg tablet [LORATADINE (CLARITIN) 10 MG TABLET] Take 10 mg by mouth daily.       metFORMIN (GLUCOPHAGE) 500 MG tablet [METFORMIN (GLUCOPHAGE) 500 MG TABLET] Take 1,000 mg by mouth 2 (two) times a day.       metoprolol succinate ER (TOPROL-XL) 100 MG 24 hr tablet TAKE 1 TABLET BY MOUTH  DAILY 90 tablet 0     oxyCODONE (ROXICODONE) 5 MG immediate release tablet [OXYCODONE (ROXICODONE) 5 MG IMMEDIATE RELEASE TABLET] Take 5 mg by mouth every 4 (four) hours as needed for pain.       potassium chloride SA (K-DUR,KLOR-CON) 20 MEQ tablet [POTASSIUM CHLORIDE SA (K-DUR,KLOR-CON) 20 MEQ TABLET] Take 20 mEq by mouth daily.       sertraline (ZOLOFT) 50 MG tablet [SERTRALINE (ZOLOFT) 50 MG TABLET] Take 50 mg by mouth daily.       simvastatin (ZOCOR) 80 MG tablet [SIMVASTATIN (ZOCOR) 80 MG TABLET] Take 80 mg by mouth at bedtime.       celecoxib (CELEBREX) 100 MG capsule [CELECOXIB (CELEBREX) 100 MG CAPSULE] 100 mg 2 (two) times a day.  (Patient not taking: Reported on 3/21/2022)       fluticasone (FLOVENT HFA) 110 mcg/actuation inhaler [FLUTICASONE (FLOVENT HFA) 110 MCG/ACTUATION INHALER] Inhale 2 puffs 2 (two) times a day. (Patient not taking: Reported on 3/21/2022)       glimepiride (AMARYL) 2 MG tablet [GLIMEPIRIDE (AMARYL) 2 MG TABLET] Take 1 mg by mouth 2 (two) times a day.  (Patient not taking: Reported on 3/21/2022)       INVOKANA 300 mg Tab [INVOKANA 300 MG TAB] 300 mg once.  (Patient not taking: Reported on 3/21/2022)       ipratropium (ATROVENT HFA) 17 mcg/actuation inhaler [IPRATROPIUM (ATROVENT HFA) 17 MCG/ACTUATION INHALER] Inhale 1 or 2 puffs 4 times daily as needed (Patient not taking: Reported on 3/21/2022)       meclizine (ANTIVERT) 12.5 mg tablet [MECLIZINE (ANTIVERT) 12.5 MG TABLET] Take 1-2 tablets by mouth 3 (three) times a day as needed. (Patient not taking: Reported on 3/21/2022)       Objective:   Vital signs:  BP (!) 140/52 (BP Location: Right arm, Patient Position: Sitting,  Cuff Size: Adult Regular)   Pulse 56   Resp 12   Wt 87 kg (191 lb 14.4 oz)   BMI 30.97 kg/m    Heart rate about 60 and irregular with ectopic or missed beats about every third    Physical Exam:          GENERAL APPEARANCE: Alert, cooperative and in no acute distress.  HEENT: No scleral icterus. No Xanthelasma. Oral mucous membranes pink and moist.  NECK: JVP normal cm. No Hepatojugular reflux. Thyroid not  Palpable  CHEST: clear to auscultation and percussion  CARDIOVASCULAR: S1, S2 without murmur    Brachial, radial  pulses are intact and symmetric.   No carotid bruits noted.  ABDOMEN: Non tender. BS+. No bruits.  EXTREMITIES: No cyanosis, clubbing or edema.    Lab Results:  LIPIDS:  No results found for: CHOL  No results found for: HDL  No components found for: LDLCALC  No results found for: TRIG  No results found for: CHOLHDL    BMP:  No results found for: CREATININE, BUN, NA, CO2      This note has been dictated using voice recognition software. Any grammatical or context distortions are unintentional and inherent to the software.      Tonny Miller MD  [facility]  [phone]

## 2022-03-22 LAB
ATRIAL RATE - MUSE: 64 BPM
DIASTOLIC BLOOD PRESSURE - MUSE: NORMAL MMHG
INTERPRETATION ECG - MUSE: NORMAL
P AXIS - MUSE: -18 DEGREES
PR INTERVAL - MUSE: 258 MS
QRS DURATION - MUSE: 120 MS
QT - MUSE: 426 MS
QTC - MUSE: 439 MS
R AXIS - MUSE: -47 DEGREES
SYSTOLIC BLOOD PRESSURE - MUSE: NORMAL MMHG
T AXIS - MUSE: -9 DEGREES
VENTRICULAR RATE- MUSE: 64 BPM

## 2022-03-27 ENCOUNTER — HEALTH MAINTENANCE LETTER (OUTPATIENT)
Age: 82
End: 2022-03-27

## 2022-06-05 DIAGNOSIS — I31.9 DISEASE OF PERICARDIUM: ICD-10-CM

## 2022-06-05 DIAGNOSIS — I47.20 VT (VENTRICULAR TACHYCARDIA) (H): ICD-10-CM

## 2022-06-05 DIAGNOSIS — G47.33 OBSTRUCTIVE SLEEP APNEA SYNDROME: ICD-10-CM

## 2022-06-07 RX ORDER — METOPROLOL SUCCINATE 100 MG/1
TABLET, EXTENDED RELEASE ORAL
Qty: 90 TABLET | Refills: 3 | Status: SHIPPED | OUTPATIENT
Start: 2022-06-07

## 2022-07-17 ENCOUNTER — HEALTH MAINTENANCE LETTER (OUTPATIENT)
Age: 82
End: 2022-07-17

## 2022-09-25 ENCOUNTER — HEALTH MAINTENANCE LETTER (OUTPATIENT)
Age: 82
End: 2022-09-25

## 2023-01-30 ENCOUNTER — HEALTH MAINTENANCE LETTER (OUTPATIENT)
Age: 83
End: 2023-01-30

## 2023-05-13 ENCOUNTER — HEALTH MAINTENANCE LETTER (OUTPATIENT)
Age: 83
End: 2023-05-13

## 2023-10-09 ENCOUNTER — TRANSFERRED RECORDS (OUTPATIENT)
Dept: HEALTH INFORMATION MANAGEMENT | Facility: CLINIC | Age: 83
End: 2023-10-09

## 2023-10-14 ENCOUNTER — HEALTH MAINTENANCE LETTER (OUTPATIENT)
Age: 83
End: 2023-10-14

## 2023-10-15 ENCOUNTER — LAB REQUISITION (OUTPATIENT)
Dept: LAB | Facility: CLINIC | Age: 83
End: 2023-10-15
Payer: MEDICARE

## 2023-10-15 DIAGNOSIS — Z51.81 ENCOUNTER FOR THERAPEUTIC DRUG LEVEL MONITORING: ICD-10-CM

## 2023-10-16 ENCOUNTER — LAB REQUISITION (OUTPATIENT)
Dept: LAB | Facility: CLINIC | Age: 83
End: 2023-10-16
Payer: MEDICARE

## 2023-10-16 DIAGNOSIS — I10 ESSENTIAL (PRIMARY) HYPERTENSION: ICD-10-CM

## 2023-10-16 LAB
ANION GAP SERPL CALCULATED.3IONS-SCNC: 12 MMOL/L (ref 7–15)
BUN SERPL-MCNC: 17.1 MG/DL (ref 8–23)
CALCIUM SERPL-MCNC: 8.8 MG/DL (ref 8.8–10.2)
CHLORIDE SERPL-SCNC: 105 MMOL/L (ref 98–107)
CREAT SERPL-MCNC: 0.88 MG/DL (ref 0.67–1.17)
DEPRECATED HCO3 PLAS-SCNC: 24 MMOL/L (ref 22–29)
EGFRCR SERPLBLD CKD-EPI 2021: 85 ML/MIN/1.73M2
GLUCOSE SERPL-MCNC: 129 MG/DL (ref 70–99)
HGB BLD-MCNC: 11.3 G/DL (ref 13.3–17.7)
POTASSIUM SERPL-SCNC: 4 MMOL/L (ref 3.4–5.3)
SODIUM SERPL-SCNC: 141 MMOL/L (ref 135–145)

## 2023-10-16 PROCEDURE — 36415 COLL VENOUS BLD VENIPUNCTURE: CPT | Performed by: INTERNAL MEDICINE

## 2023-10-16 PROCEDURE — P9604 ONE-WAY ALLOW PRORATED TRIP: HCPCS | Performed by: INTERNAL MEDICINE

## 2023-10-16 PROCEDURE — 80048 BASIC METABOLIC PNL TOTAL CA: CPT | Performed by: INTERNAL MEDICINE

## 2023-10-16 PROCEDURE — 85018 HEMOGLOBIN: CPT | Performed by: INTERNAL MEDICINE

## 2023-10-17 LAB
ANION GAP SERPL CALCULATED.3IONS-SCNC: 13 MMOL/L (ref 7–15)
BUN SERPL-MCNC: 19.1 MG/DL (ref 8–23)
CALCIUM SERPL-MCNC: 9 MG/DL (ref 8.8–10.2)
CHLORIDE SERPL-SCNC: 106 MMOL/L (ref 98–107)
CREAT SERPL-MCNC: 0.78 MG/DL (ref 0.67–1.17)
DEPRECATED HCO3 PLAS-SCNC: 22 MMOL/L (ref 22–29)
EGFRCR SERPLBLD CKD-EPI 2021: 88 ML/MIN/1.73M2
GLUCOSE SERPL-MCNC: 133 MG/DL (ref 70–99)
POTASSIUM SERPL-SCNC: 3.6 MMOL/L (ref 3.4–5.3)
SODIUM SERPL-SCNC: 141 MMOL/L (ref 135–145)

## 2023-10-17 PROCEDURE — 36415 COLL VENOUS BLD VENIPUNCTURE: CPT | Performed by: INTERNAL MEDICINE

## 2023-10-17 PROCEDURE — 80048 BASIC METABOLIC PNL TOTAL CA: CPT | Performed by: INTERNAL MEDICINE

## 2023-10-17 PROCEDURE — P9604 ONE-WAY ALLOW PRORATED TRIP: HCPCS | Performed by: INTERNAL MEDICINE

## 2024-03-02 ENCOUNTER — HEALTH MAINTENANCE LETTER (OUTPATIENT)
Age: 84
End: 2024-03-02

## 2024-07-20 ENCOUNTER — HEALTH MAINTENANCE LETTER (OUTPATIENT)
Age: 84
End: 2024-07-20

## 2024-12-07 ENCOUNTER — HEALTH MAINTENANCE LETTER (OUTPATIENT)
Age: 84
End: 2024-12-07

## 2025-03-15 ENCOUNTER — HEALTH MAINTENANCE LETTER (OUTPATIENT)
Age: 85
End: 2025-03-15